# Patient Record
Sex: MALE
[De-identification: names, ages, dates, MRNs, and addresses within clinical notes are randomized per-mention and may not be internally consistent; named-entity substitution may affect disease eponyms.]

---

## 2020-01-30 PROBLEM — Z00.00 ENCOUNTER FOR PREVENTIVE HEALTH EXAMINATION: Status: ACTIVE | Noted: 2020-01-30

## 2020-02-03 ENCOUNTER — APPOINTMENT (OUTPATIENT)
Dept: PEDIATRIC ORTHOPEDIC SURGERY | Facility: CLINIC | Age: 20
End: 2020-02-03
Payer: MEDICAID

## 2020-02-03 DIAGNOSIS — Z78.9 OTHER SPECIFIED HEALTH STATUS: ICD-10-CM

## 2020-02-03 PROCEDURE — 99204 OFFICE O/P NEW MOD 45 MIN: CPT | Mod: 25

## 2020-02-03 PROCEDURE — 72082 X-RAY EXAM ENTIRE SPI 2/3 VW: CPT

## 2020-02-11 PROBLEM — Z78.9 NO PERTINENT PAST MEDICAL HISTORY: Status: RESOLVED | Noted: 2020-02-11 | Resolved: 2020-02-11

## 2020-02-11 PROBLEM — Z78.9 NO PERTINENT PAST SURGICAL HISTORY: Status: RESOLVED | Noted: 2020-02-11 | Resolved: 2020-02-11

## 2020-02-11 NOTE — HISTORY OF PRESENT ILLNESS
[Stable] : stable [7] : currently ~his/her~ pain is 7 out of 10 [Intermit.] : ~He/She~ states the symptoms seem to be intermittent [Sitting] : worsened by sitting [FreeTextEntry1] : This is a 19 year old male who presents for a second opinion. Patient has been seeing Dr. Swartz (pediatric orthopedic surgeon) at [facility] for about 2.5 years. Patient was treated with a brace since that time, has been wearing it 7-8 hours every night without much improvement of pain. The low back pain mainly occurs at night. Patient denies numbness/tingling/weakness/paresthesias. Patient does have a brother who was diagnosed with spina bifida and scoliosis treated with posterior fusion. Patient has been experiencing back pain. This is nonradiating in nature. It does not limit patient from carrying out the activities of daily living. Patient does not take any pain medication except for over the counter medication occasionally. There are no specific aggravating or relieving factors. There is no history of any weakness in his legs, tingling, numbness, bladder bowel dysfunction.

## 2020-02-11 NOTE — PHYSICAL EXAM
[FreeTextEntry1] : Healthy appearing  awake, alert, in no apparent distress. Pleasant and cooperative. Good respiratory effort, no wheeze heard without use of stethoscope. Ambulates independently without evidence of antalgia. Good coordination and balance. Able to stand on tip-toes and heels and walks with normal heel-toe gait. Able to get on and off the exam table without difficulty. Gross cutaneous exam is normal, no cafe au lait spots, large birthmarks, or skin lesions. No lymphedema. Patient has brisk capillary refill with peripheral pulses intact.\par \par General: Patient is awake and alert and in no acute distress. oriented to person, place, and time. Well developed, well nourished, cooperative. \par \par Skin: The skin is intact, warm, pink, and dry over the area examined. \par \par Eyes: normal conjunctiva, normal eyelids and pupils were equal and round. \par \par ENT: normal ears, normal nose and normal lips.\par \par Cardiovascular: There is brisk capillary refill in the digits of the affected extremity. They are symmetric pulses in the bilateral upper and lower extremities, positive peripheral pulses, brisk capillary refill, but no peripheral edema.\par \par Respiratory: The patient is in no apparent respiratory distress. They're taking full deep breaths without use of accessory muscles or evidence of audible wheezes or stridor without the use of a stethoscope, normal respiratory effort. \par \par Neurological: 5/5 motor strength in the main muscle groups of bilateral lower extremities, sensory intact in bilateral lower extremities. \par \par Musculoskeletal: \par No obvious abnormalities in the upper or lower extremity. Full range of motion of the wrists, elbows, shoulders, ankles, knees, and hips. Full range of motion without tenderness of the neck. \par \par Examination of the back reveals shoulders are asymmetric (R>L). Scapular asymmetry (R>L), flank asymmetry. Patient is able to bend forward and touch the toes as well bend backwards without pain. Lateral flexion is symmetrical and is pain free. The pelvis is asymmetric. Straight leg raising test is free to more than 70 degrees. \par  \par There is no hairy patch, lipoma, sinus in the back. There is no pes cavus, asymmetry of calves, significant leg length discrepancy or significant cafe-au-lait spots.\par \par Muscle strength is 5/5. Patellar and Achilles reflexes are +2 B/L. No clonus or Babinski. Superficial abdominal reflexes are present in all 4 quadrants. 2+ DP pulses B/L. No limb length discrepancy noted. \par \par

## 2020-02-11 NOTE — ASSESSMENT
[FreeTextEntry1] : 19 year old male patient with spinal asymmetry and back pain. \par \par Clinical imaging and exam were reviewed with patient. Scoliosis x-rays AP and lateral done today show 30 deg thoracic curve and a 52 deg thoracolumbar curve.Natural history of scoliosis discussed in detail with patient. Long discussion with family regarding diagnosis, treatment options, and prognosis. \par Risks and benefits of surgery discussed with patient. Patient is aware that he will require surgery, although not as on an emergent basis. Patient will call the office for his decision and to begin the scheduling process. \par In the mean time, we will order a MRI to further investigate his back pain. \par For his back pain, I am recommending daily back and core strengthening exercises and physical therapy. Home exercise regimen recommended. Patient should do additional exercises for back and core strengthening such as Yoga, swimming, Pilates, planks, pull ups, etc. No activity limitations. All questions answered, patient understands and agree to plan of care. \par \par

## 2020-02-11 NOTE — REVIEW OF SYSTEMS
[Change in Activity] : no change in activity [Malaise] : no malaise [Rash] : no rash [Eye Pain] : no eye pain [Oral Ulcers] : no oral ulcers [Nasal Stuffiness] : no nasal congestion [Heart Problems] : no heart problems [Tachypnea] : no tachypnea [Congestion] : no congestion

## 2020-06-25 DIAGNOSIS — M41.9 SCOLIOSIS, UNSPECIFIED: ICD-10-CM

## 2020-06-29 ENCOUNTER — APPOINTMENT (OUTPATIENT)
Dept: PEDIATRIC ORTHOPEDIC SURGERY | Facility: CLINIC | Age: 20
End: 2020-06-29

## 2020-08-06 ENCOUNTER — APPOINTMENT (OUTPATIENT)
Dept: PEDIATRIC ORTHOPEDIC SURGERY | Facility: CLINIC | Age: 20
End: 2020-08-06